# Patient Record
Sex: FEMALE | Race: OTHER | HISPANIC OR LATINO | URBAN - METROPOLITAN AREA
[De-identification: names, ages, dates, MRNs, and addresses within clinical notes are randomized per-mention and may not be internally consistent; named-entity substitution may affect disease eponyms.]

---

## 2024-05-13 ENCOUNTER — EMERGENCY (EMERGENCY)
Facility: HOSPITAL | Age: 24
LOS: 1 days | Discharge: HOME | End: 2024-05-13
Attending: STUDENT IN AN ORGANIZED HEALTH CARE EDUCATION/TRAINING PROGRAM
Payer: SELF-PAY

## 2024-05-13 VITALS
TEMPERATURE: 98 F | RESPIRATION RATE: 20 BRPM | HEART RATE: 68 BPM | HEIGHT: 66 IN | OXYGEN SATURATION: 98 % | SYSTOLIC BLOOD PRESSURE: 119 MMHG | DIASTOLIC BLOOD PRESSURE: 71 MMHG | WEIGHT: 167.55 LBS

## 2024-05-13 PROCEDURE — 99284 EMERGENCY DEPT VISIT MOD MDM: CPT

## 2024-05-13 PROCEDURE — 99283 EMERGENCY DEPT VISIT LOW MDM: CPT

## 2024-05-13 PROCEDURE — T1013: CPT

## 2024-05-13 RX ORDER — LIDOCAINE 4 G/100G
1 CREAM TOPICAL ONCE
Refills: 0 | Status: COMPLETED | OUTPATIENT
Start: 2024-05-13 | End: 2024-05-13

## 2024-05-13 RX ORDER — METHOCARBAMOL 500 MG/1
2 TABLET, FILM COATED ORAL
Qty: 30 | Refills: 0
Start: 2024-05-13 | End: 2024-05-17

## 2024-05-13 RX ORDER — IBUPROFEN 200 MG
1 TABLET ORAL
Qty: 28 | Refills: 0
Start: 2024-05-13 | End: 2024-05-19

## 2024-05-13 RX ORDER — METHOCARBAMOL 500 MG/1
1500 TABLET, FILM COATED ORAL ONCE
Refills: 0 | Status: COMPLETED | OUTPATIENT
Start: 2024-05-13 | End: 2024-05-13

## 2024-05-13 RX ORDER — IBUPROFEN 200 MG
600 TABLET ORAL ONCE
Refills: 0 | Status: COMPLETED | OUTPATIENT
Start: 2024-05-13 | End: 2024-05-13

## 2024-05-13 RX ORDER — ACETAMINOPHEN 500 MG
650 TABLET ORAL ONCE
Refills: 0 | Status: COMPLETED | OUTPATIENT
Start: 2024-05-13 | End: 2024-05-13

## 2024-05-13 RX ADMIN — Medication 650 MILLIGRAM(S): at 20:22

## 2024-05-13 RX ADMIN — METHOCARBAMOL 1500 MILLIGRAM(S): 500 TABLET, FILM COATED ORAL at 20:22

## 2024-05-13 RX ADMIN — Medication 600 MILLIGRAM(S): at 20:22

## 2024-05-13 RX ADMIN — LIDOCAINE 1 PATCH: 4 CREAM TOPICAL at 20:23
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
## 2024-05-13 NOTE — ED PROVIDER NOTE - NSFOLLOWUPINSTRUCTIONS_ED_ALL_ED_FT
26 - Prescription sent to pharmacy.  - Ibuprofen 600mg every 6 hours as needed for pain.  - Acetaminophen 650mg every 6 hours as needed for pain.   - Please bring all documentation from your ED visit to any related future follow up appointment.  - Please call to schedule follow up appointment with your primary care physician within 24-48 hours.  - Please seek immediate medical attention for any new/worsening, signs/symptoms, or concerns.    Feel better!    - Receta enviada a farmacia.  - Ibuprofeno 600mg cada 6 horas según necesidad para el dolor.  - Acetaminofén 650 mg cada 6 horas según sea necesario para el dolor.   - Traiga toda la documentación de prabhakar visita al servicio de urgencias a cualquier darin de seguimiento futura relacionada.  - Llame para programar joycelyn darin de seguimiento con prabhakar médico de atención primaria dentro de las 24 a 48 horas.  - Busque atención médica inmediata ante cualquier nuevo / empeoramiento, signos / síntomas o inquietudes.    ¡Sentirse mejor!       Accidente automovilístico    LO QUE NECESITA SABER:    Los accidentes automovilísticos pueden causar lesiones ocasionadas por el impacto o por filemon sido movido de un lado al otro dentro del bindu. Podría tener un hematoma en el abdomen, pecho o ronit debido al cinturón de seguridad. También puede que tenga dolor en prabhakar rodolfo, ronit o espalda. Podría sentir dolor en las rodillas, caderas o muslos si prabhakar cuerpo golpea el tablero o el volante. El dolor muscular tiende a empeorar de 1 a 2 días después del accidente.    INSTRUCCIONES SOBRE EL [facility]PITAL[facility]IA:    Llame al número de emergencias local (911 en los [place]) si:  •Usted tiene un nuevo dolor de pecho o éste empeora, o tiene falta de aliento.          Llame a prabhakar médico si:  •Usted tiene un dolor nuevo o peor en el abdomen.      •Usted tiene náuseas y vómitos que no mejoran.      •Usted tiene un yandy dolor de too.      •Usted tiene debilidad, hormigueo o adormecimiento en maribell brazos o piernas.      •Usted tiene un dolor nuevo o peor que le dificulta el movimiento.      •Usted tiene dolor que aparece de 2 a 3 días después del accidente.      •Usted tiene preguntas o inquietudes acerca de prabhakar condición o cuidado.      Medicamentos:  •Analgésicos:Usted podría recibir medicamento para quitarle o reducir el dolor. No espere a que el dolor sea muy intenso para domenico el medicamento.      •Los RODOLFO,boone el ibuprofeno, ayudan a disminuir la inflamación, el dolor y la fiebre. Kely medicamento está disponible con o sin joycelyn receta médica. Los RODOLFO pueden causar sangrado estomacal o problemas renales en ciertas personas. Si usted esta tomando un anticoágulante,  siempre pregunte si los AINEs son seguros para usted. Siempre orin la etiqueta de kely medicamento y siga las instrucciones. No administre kely medicamento a niños menores de 6 meses de keshav sin antes obtener la autorización de prabhakar médico.      •[place] maribell medicamentos boone se le haya indicado.Consulte con prabhakar médico si usted armida que prabhakar medicamento no le está ayudando o si presenta efectos secundarios. Infórmele si es alérgico a algún medicamento. Mantenga joycelyn lista actualizada de los medicamentos, las vitaminas y los productos herbales que rachel. Incluya los siguientes datos de los medicamentos: cantidad, frecuencia y motivo de administración. Traiga con usted la lista o los envases de las píldoras a maribell citas de seguimiento. Lleve la lista de los medicamentos con usted en tiffanie de joycelyn emergencia.      Cuidados personales:  •Use hielo y calor.El hielo ayuda a disminuir la inflamación y el dolor. El hielo también puede contribuir a evitar el daño de los tejidos. Use joycelyn compresa de hielo o ponga hielo triturado en joycelyn bolsa de plástico. [place]brala con joycelyn toalla y aplíquela al área adolorida por 15 a 20 minutos cada hora o boone se le indique. Después de 2 días, use joycelyn compresa caliente en el área lesionada. Aplique calor boone se lo recomiende el médico.      •Estire maribell músculos cuidadosamente.Maico ejercicios suaves para estirar maribell músculos después de filemon sufrido un accidente automovilístico. Consulte con prabhakar médico sobre cuáles ejercicios hacer.      Consejos de seguridad:Lo siguiente puede ayudar a prevenir otro accidente automovilístico o a reducir el riesgo de lesiones:   •Use siempre prabhakar cinturón de seguridad.El uso de prabhakar cinturón de seguridad ayudará a reducir las lesiones sufridas por accidentes automovilísticos. El cinturón de seguridad debe tener joycelyn eubanks que atraviese prabhakar pecho y otra que atraviese prabhakar regazo.      •Siempre coloque a prabhakar hijo en un asiento de seguridad para niños.Use un asiento de seguridad hecho para prabhakar edad, altura y peso. Elija un asiento de seguridad que tenga un arnés y un broche. Coloque el asiento de seguridad en la plaza del medio del asiento trasero del automóvil. El asiento de seguridad no debería moverse en ninguna dirección más de 1 pulgada después de ajustarlo. Siga siempre las instrucciones proporcionadas para prabhakar asiento de seguridad para ayudarle a colocarlo. Las instrucciones también le indicarán cómo sujetar a prabhakar willy en el asiento correctamente. Pregúntele a prabhakar médico sobre más información acerca de los asientos de seguridad para niños.   Asiento de seguridad para niños en automóviles           •Disminuya la velocidad.Maneje prabhakar bindu al límite de velocidad para reducir prabhakar riesgo de accidentes automovilísticos.      •No maneje si se siente cansado.Usted reacciona más lentamente cuando está cansado. El tiempo de reacción lento aumentará el riesgo de un accidente automovilístico.      •No hable por teléfono ni envíe mensajes de texto mientras maneje.Usted no reaccionará lo suficientemente rápido en joycelyn emergencia si se distrae con mensajes de texto o conversaciones.      •No consuma drogas ni alcohol antes de manejar.Es probable que se sienta más cansado o tome riesgos que usualmente no tomaría. No maneje después de domenico medicamentos que le dan sueño. Use un conductor designado o maico arreglos para que lo lleven a prabhakar casa.      •Ayude a prabhakar hijo adolescente a convertirse en un conductor seguro.Sea un buen modelo al manejar. Hable con prabhakar hijo adolescente sobre las maneras de reducir el riesgo de un accidente automovilístico. Estas incluyen no conducir cuando está cansado y no tener distracciones, boone un teléfono. Recuérdele a prabhakar hijo adolescente que siempre debe ir al límite de velocidad y usar el cinturón de seguridad.      Acuda a maribell consultas de control con prabhakar médico según le indicaron.[place] maribell preguntas para que se acuerde de hacerlas elba maribell visitas.
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
## 2024-05-13 NOTE — ED PROVIDER NOTE - PATIENT PORTAL LINK FT
You can access the FollowMyHealth Patient Portal offered by [facility] by registering at the following website: [email or web address]. By joining [employer]’s FollowMyHealth portal, you will also be able to view your health information using other applications (apps) compatible with our system.
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
## 2025-03-30 NOTE — ED ADULT TRIAGE NOTE - GLASGOW COMA SCALE: SCORE, MLM
A&Ox3. RA. Up x2 w/ walker and gait belt. Neuro checks q4, no changes    No c/o n/v. Multiple loose BMs    C/o pain w/ moving at pleurx site    R port and R PIVs c/d/i, flushed    Meds adx per MD orders. No acute changes   15